# Patient Record
Sex: MALE | Race: WHITE | HISPANIC OR LATINO | Employment: FULL TIME | URBAN - METROPOLITAN AREA
[De-identification: names, ages, dates, MRNs, and addresses within clinical notes are randomized per-mention and may not be internally consistent; named-entity substitution may affect disease eponyms.]

---

## 2024-05-19 ENCOUNTER — HOSPITAL ENCOUNTER (EMERGENCY)
Facility: HOSPITAL | Age: 34
Discharge: HOME | End: 2024-05-19
Attending: EMERGENCY MEDICINE | Admitting: EMERGENCY MEDICINE
Payer: COMMERCIAL

## 2024-05-19 ENCOUNTER — APPOINTMENT (OUTPATIENT)
Dept: GENERAL RADIOLOGY | Facility: HOSPITAL | Age: 34
End: 2024-05-19
Payer: COMMERCIAL

## 2024-05-19 VITALS
HEIGHT: 66 IN | HEART RATE: 101 BPM | BODY MASS INDEX: 32.78 KG/M2 | TEMPERATURE: 98.5 F | DIASTOLIC BLOOD PRESSURE: 96 MMHG | OXYGEN SATURATION: 97 % | SYSTOLIC BLOOD PRESSURE: 145 MMHG | WEIGHT: 204 LBS | RESPIRATION RATE: 16 BRPM

## 2024-05-19 DIAGNOSIS — M54.12 CERVICAL RADICULOPATHY: Primary | ICD-10-CM

## 2024-05-19 PROCEDURE — 63710000001 ONDANSETRON ODT 4 MG TABLET DISPERSIBLE: Performed by: EMERGENCY MEDICINE

## 2024-05-19 PROCEDURE — 73030 X-RAY EXAM OF SHOULDER: CPT

## 2024-05-19 PROCEDURE — 99283 EMERGENCY DEPT VISIT LOW MDM: CPT

## 2024-05-19 RX ORDER — PREDNISONE 50 MG/1
50 TABLET ORAL DAILY
Qty: 7 TABLET | Refills: 0 | Status: SHIPPED | OUTPATIENT
Start: 2024-05-19

## 2024-05-19 RX ORDER — CYCLOBENZAPRINE HCL 10 MG
10 TABLET ORAL 3 TIMES DAILY PRN
Qty: 21 TABLET | Refills: 0 | Status: SHIPPED | OUTPATIENT
Start: 2024-05-19

## 2024-05-19 RX ORDER — ONDANSETRON 4 MG/1
4 TABLET, ORALLY DISINTEGRATING ORAL ONCE
Status: COMPLETED | OUTPATIENT
Start: 2024-05-19 | End: 2024-05-19

## 2024-05-19 RX ORDER — HYDROCODONE BITARTRATE AND ACETAMINOPHEN 7.5; 325 MG/1; MG/1
1 TABLET ORAL ONCE
Status: COMPLETED | OUTPATIENT
Start: 2024-05-19 | End: 2024-05-19

## 2024-05-19 RX ADMIN — HYDROCODONE BITARTRATE AND ACETAMINOPHEN 1 TABLET: 7.5; 325 TABLET ORAL at 03:54

## 2024-05-19 RX ADMIN — ONDANSETRON 4 MG: 4 TABLET, ORALLY DISINTEGRATING ORAL at 03:53

## 2024-05-19 NOTE — ED NOTES
Pt here for c/o left shoulder pain that started 5 days ago, denies injury or trauma. Pt with equal /strength in upper extremities, although pt states sensation is reduced in left arm/hand.         used for assessment - [id number]

## 2024-05-19 NOTE — ED PROVIDER NOTES
EMERGENCY DEPARTMENT ENCOUNTER    Room Number:  34/34  Date of encounter:  5/19/2024  PCP: Provider, No Known  Historian: Patient, spouse  Chronic or social conditions impacting care (social determinants of health): None   service used    HPI:  Chief Complaint: Arm pain  A complete HPI/ROS/PMH/PSH/SH/FH are unobtainable due to: Nothing    Context: Yelitza Frazier is a 34 y.o. male presents to the ED c/o acute left shoulder pain starting 5 days ago.  Patient states he awoke with the pain.  He describes an achy, sharp pain going from his left shoulder down his arm to his fingers.  He complains of numbness and tingling.  He denies any weakness.  Denies any chest pain, shortness of breath.  Denies any overt neck pain.  He denies any history of underlying heart disease.    Review of prior external notes (non-ED):   Reviewed urgent care office visit from 12/21/2022.  Patient seen for right shoulder pain.      Review of prior external test results outside of this encounter:  Negative right shoulder x-ray from 12/21/2022.    PAST MEDICAL HISTORY  Active Ambulatory Problems     Diagnosis Date Noted    No Active Ambulatory Problems     Resolved Ambulatory Problems     Diagnosis Date Noted    No Resolved Ambulatory Problems     No Additional Past Medical History         PAST SURGICAL HISTORY  No past surgical history on file.      FAMILY HISTORY  No family history on file.      SOCIAL HISTORY  Social History     Socioeconomic History    Marital status:    Tobacco Use    Smoking status: Never    Smokeless tobacco: Never   Substance and Sexual Activity    Alcohol use: Never    Drug use: Never    Sexual activity: Never         ALLERGIES  Patient has no known allergies.        REVIEW OF SYSTEMS  All systems reviewed and negative except for those discussed in HPI.       PHYSICAL EXAM    I have reviewed the triage vital signs and nursing notes.    ED Triage Vitals   Temp Heart Rate Resp BP SpO2    05/19/24 0106 05/19/24 0106 05/19/24 0106 05/19/24 0112 05/19/24 0106   98.5 °F (36.9 °C) (!) 121 18 148/94 96 %      Temp src Heart Rate Source Patient Position BP Location FiO2 (%)   -- -- 05/19/24 0112 05/19/24 0112 --     Sitting Right arm        Physical Exam  GENERAL: Alert, oriented, not distressed  HENT: head atraumatic, no cervical tenderness  EYES: no scleral icterus, EOMI  CV: regular rhythm, regular rate, no murmur  RESPIRATORY: normal effort, CTA  ABDOMEN: soft, nontender  MUSCULOSKELETAL: Full range of motion of left shoulder without bony tenderness   NEURO: alert, 5/5 strength of bilateral upper extremities.  2+ bicep bilaterally   SKIN: warm, dry        LAB RESULTS  No results found for this or any previous visit (from the past 24 hour(s)).    Ordered the above labs and independently reviewed the results.        RADIOLOGY  XR Shoulder 2+ View Left    Result Date: 5/19/2024  XR SHOULDER 2+ VW LEFT-  STANDARD INTERNALLY AND EXTERNALLY ROTATED VIEWS OF THE LEFT SHOULDER.  CLINICAL INFORMATION: Pain  FINDINGS: Glenohumeral and acromioclavicular joint width is preserved. No atypical bone formation. No fracture nor dislocation. Surrounding soft tissues and adjacent ribs satisfactory in appearance.  CONCLUSION: Normal left shoulder.  This report was finalized on 5/19/2024 4:44 AM by Dr. Malcolm Sarmiento M.D on Workstation: [id number]       I ordered the above noted radiological studies. Reviewed by me and discussed with radiologist.  See dictation for official radiology interpretation.      MEDICATIONS GIVEN IN ER    Medications   HYDROcodone-acetaminophen (NORCO) 7.5-325 MG per tablet 1 tablet (1 tablet Oral Given 5/19/24 0354)   ondansetron ODT (ZOFRAN-ODT) disintegrating tablet 4 mg (4 mg Oral Given 5/19/24 0353)         ADDITIONAL ORDERS CONSIDERED BUT NOT ORDERED:  Considered cardiac workup however patient's pain is clearly radicular in nature.  He denies any chest pain or shortness of  breath.      PROGRESS, DATA ANALYSIS, CONSULTS, AND MEDICAL DECISION MAKING    All labs have been independently interpreted by myself.  All radiology studies have been independently interpreted by myself and discussed with radiologist dictating the report.   EKG's independently interpreted by myself.  Discussion below represents my analysis of pertinent findings related to patient's condition, differential diagnosis, treatment plan and final disposition.    I have discussed case with Dr. Esquivel, emergency room physician.  He has performed his own bedside examination and agrees with treatment plan.    ED Course as of 05/19/24 0551   Sun May 19, 2024   0331 Patient presents with a 5-day history of atraumatic left shoulder pain with radiation down the left arm to the hand.  Patient complains of numbness, tingling.  He denies any overt weakness.  He denies any chest pain or shortness of breath.  Suspect likely cervical radiculopathy.  Differential diagnoses include not limited to cervical radiculopathy, ACS, rotator cuff injury. [EE]      ED Course User Index  [EE] Jonathan Cramer PA       AS OF 05:51 EDT VITALS:    BP - 145/96  HR - 101  TEMP - 98.5 °F (36.9 °C)  O2 SATS - 97%        DIAGNOSIS  Final diagnoses:   Cervical radiculopathy         DISPOSITION  Discharged    Admission was considered but after careful review of the patient's presentation, physical examination, diagnostic results, and response to treatment the patient may be safely discharged with outpatient follow-up.         Dictated utilizing Dragon dictation     Jonathan Cramer PA  05/19/24 0539